# Patient Record
Sex: MALE | Race: BLACK OR AFRICAN AMERICAN
[De-identification: names, ages, dates, MRNs, and addresses within clinical notes are randomized per-mention and may not be internally consistent; named-entity substitution may affect disease eponyms.]

---

## 2018-02-22 ENCOUNTER — HOSPITAL ENCOUNTER (INPATIENT)
Dept: HOSPITAL 74 - YASAS | Age: 53
LOS: 4 days | Discharge: HOME | End: 2018-02-26
Attending: INTERNAL MEDICINE | Admitting: INTERNAL MEDICINE
Payer: COMMERCIAL

## 2018-02-22 VITALS — BODY MASS INDEX: 24.7 KG/M2

## 2018-02-22 DIAGNOSIS — F19.24: ICD-10-CM

## 2018-02-22 DIAGNOSIS — F17.210: ICD-10-CM

## 2018-02-22 DIAGNOSIS — Z21: ICD-10-CM

## 2018-02-22 DIAGNOSIS — F14.20: ICD-10-CM

## 2018-02-22 DIAGNOSIS — G47.00: ICD-10-CM

## 2018-02-22 DIAGNOSIS — F10.230: Primary | ICD-10-CM

## 2018-02-22 DIAGNOSIS — F31.9: ICD-10-CM

## 2018-02-22 DIAGNOSIS — F25.9: ICD-10-CM

## 2018-02-22 DIAGNOSIS — F12.20: ICD-10-CM

## 2018-02-22 PROCEDURE — HZ2ZZZZ DETOXIFICATION SERVICES FOR SUBSTANCE ABUSE TREATMENT: ICD-10-PCS | Performed by: INTERNAL MEDICINE

## 2018-02-22 RX ADMIN — HYDROXYZINE PAMOATE PRN MG: 50 CAPSULE ORAL at 22:29

## 2018-02-22 RX ADMIN — NICOTINE SCH: 14 PATCH, EXTENDED RELEASE TRANSDERMAL at 17:07

## 2018-02-22 RX ADMIN — IBUPROFEN PRN MG: 400 TABLET, FILM COATED ORAL at 22:44

## 2018-02-22 RX ADMIN — Medication SCH MG: at 22:28

## 2018-02-23 LAB
ALBUMIN SERPL-MCNC: 3.7 G/DL (ref 3.4–5)
ALP SERPL-CCNC: 93 U/L (ref 45–117)
ALT SERPL-CCNC: 29 U/L (ref 12–78)
ANION GAP SERPL CALC-SCNC: 12 MMOL/L (ref 8–16)
APPEARANCE UR: CLEAR
AST SERPL-CCNC: 29 U/L (ref 15–37)
BILIRUB SERPL-MCNC: 0.5 MG/DL (ref 0.2–1)
BILIRUB UR STRIP.AUTO-MCNC: NEGATIVE MG/DL
BUN SERPL-MCNC: 13 MG/DL (ref 7–18)
CALCIUM SERPL-MCNC: 9.3 MG/DL (ref 8.5–10.1)
CHLORIDE SERPL-SCNC: 107 MMOL/L (ref 98–107)
CO2 SERPL-SCNC: 23 MMOL/L (ref 21–32)
COLOR UR: (no result)
CREAT SERPL-MCNC: 1.2 MG/DL (ref 0.7–1.3)
DEPRECATED RDW RBC AUTO: 13.4 % (ref 11.9–15.9)
GLUCOSE SERPL-MCNC: 121 MG/DL (ref 74–106)
HCT VFR BLD CALC: 41.1 % (ref 35.4–49)
HGB BLD-MCNC: 13.1 GM/DL (ref 11.7–16.9)
KETONES UR QL STRIP: NEGATIVE
LEUKOCYTE ESTERASE UR QL STRIP.AUTO: NEGATIVE
MCH RBC QN AUTO: 30.8 PG (ref 25.7–33.7)
MCHC RBC AUTO-ENTMCNC: 31.9 G/DL (ref 32–35.9)
MCV RBC: 96.6 FL (ref 80–96)
NITRITE UR QL STRIP: NEGATIVE
PH UR: 6 [PH] (ref 5–8)
PLATELET # BLD AUTO: 213 K/MM3 (ref 134–434)
PMV BLD: 8.8 FL (ref 7.5–11.1)
POTASSIUM SERPLBLD-SCNC: 4.1 MMOL/L (ref 3.5–5.1)
PROT SERPL-MCNC: 7.6 G/DL (ref 6.4–8.2)
PROT UR QL STRIP: NEGATIVE
PROT UR QL STRIP: NEGATIVE
RBC # BLD AUTO: 4.25 M/MM3 (ref 4–5.6)
RBC # UR STRIP: NEGATIVE /UL
SODIUM SERPL-SCNC: 142 MMOL/L (ref 136–145)
SP GR UR: 1.01 (ref 1–1.03)
UROBILINOGEN UR STRIP-MCNC: NEGATIVE MG/DL (ref 0.2–1)
WBC # BLD AUTO: 3.8 K/MM3 (ref 4–10)

## 2018-02-23 RX ADMIN — Medication SCH TAB: at 10:24

## 2018-02-23 RX ADMIN — NICOTINE SCH: 14 PATCH, EXTENDED RELEASE TRANSDERMAL at 10:24

## 2018-02-23 RX ADMIN — QUETIAPINE FUMARATE SCH MG: 100 TABLET ORAL at 22:21

## 2018-02-23 RX ADMIN — Medication SCH MG: at 22:21

## 2018-02-23 RX ADMIN — HYDROXYZINE PAMOATE PRN MG: 50 CAPSULE ORAL at 22:23

## 2018-02-23 RX ADMIN — IBUPROFEN PRN MG: 400 TABLET, FILM COATED ORAL at 17:17

## 2018-02-23 RX ADMIN — ABACAVIR SULFATE, DOLUTEGRAVIR SODIUM, LAMIVUDINE SCH EACH: 600; 50; 300 TABLET, FILM COATED ORAL at 12:19

## 2018-02-24 RX ADMIN — Medication SCH TAB: at 10:37

## 2018-02-24 RX ADMIN — Medication SCH EACH: at 22:36

## 2018-02-24 RX ADMIN — CITALOPRAM HYDROBROMIDE SCH MG: 20 TABLET ORAL at 10:37

## 2018-02-24 RX ADMIN — IBUPROFEN PRN MG: 400 TABLET, FILM COATED ORAL at 10:38

## 2018-02-24 RX ADMIN — QUETIAPINE FUMARATE SCH MG: 100 TABLET ORAL at 22:34

## 2018-02-24 RX ADMIN — NICOTINE SCH: 14 PATCH, EXTENDED RELEASE TRANSDERMAL at 10:37

## 2018-02-24 RX ADMIN — IBUPROFEN PRN MG: 400 TABLET, FILM COATED ORAL at 22:35

## 2018-02-24 RX ADMIN — ABACAVIR SULFATE, DOLUTEGRAVIR SODIUM, LAMIVUDINE SCH EACH: 600; 50; 300 TABLET, FILM COATED ORAL at 10:36

## 2018-02-24 RX ADMIN — LIDOCAINE SCH PATCH: 50 PATCH TOPICAL at 12:48

## 2018-02-24 RX ADMIN — HYDROXYZINE PAMOATE PRN MG: 50 CAPSULE ORAL at 22:37

## 2018-02-24 RX ADMIN — Medication SCH MG: at 22:34

## 2018-02-25 RX ADMIN — Medication SCH TAB: at 10:12

## 2018-02-25 RX ADMIN — CITALOPRAM HYDROBROMIDE SCH MG: 20 TABLET ORAL at 10:12

## 2018-02-25 RX ADMIN — NICOTINE SCH: 14 PATCH, EXTENDED RELEASE TRANSDERMAL at 10:14

## 2018-02-25 RX ADMIN — IBUPROFEN PRN MG: 400 TABLET, FILM COATED ORAL at 22:18

## 2018-02-25 RX ADMIN — IBUPROFEN PRN MG: 400 TABLET, FILM COATED ORAL at 15:32

## 2018-02-25 RX ADMIN — LIDOCAINE SCH PATCH: 50 PATCH TOPICAL at 10:13

## 2018-02-25 RX ADMIN — ABACAVIR SULFATE, DOLUTEGRAVIR SODIUM, LAMIVUDINE SCH EACH: 600; 50; 300 TABLET, FILM COATED ORAL at 10:11

## 2018-02-25 RX ADMIN — QUETIAPINE FUMARATE SCH MG: 100 TABLET ORAL at 22:17

## 2018-02-25 RX ADMIN — Medication SCH MG: at 22:16

## 2018-02-25 RX ADMIN — HYDROXYZINE PAMOATE PRN MG: 50 CAPSULE ORAL at 22:19

## 2018-02-25 RX ADMIN — Medication SCH EACH: at 22:17

## 2018-02-26 VITALS — HEART RATE: 74 BPM | SYSTOLIC BLOOD PRESSURE: 108 MMHG | DIASTOLIC BLOOD PRESSURE: 71 MMHG | TEMPERATURE: 96.8 F

## 2020-02-08 ENCOUNTER — EMERGENCY (EMERGENCY)
Facility: HOSPITAL | Age: 55
LOS: 1 days | Discharge: HOME | End: 2020-02-08
Attending: EMERGENCY MEDICINE | Admitting: EMERGENCY MEDICINE
Payer: MEDICAID

## 2020-02-08 VITALS
SYSTOLIC BLOOD PRESSURE: 123 MMHG | TEMPERATURE: 98 F | DIASTOLIC BLOOD PRESSURE: 88 MMHG | OXYGEN SATURATION: 98 % | RESPIRATION RATE: 16 BRPM | HEART RATE: 75 BPM

## 2020-02-08 VITALS
OXYGEN SATURATION: 97 % | HEART RATE: 91 BPM | RESPIRATION RATE: 18 BRPM | SYSTOLIC BLOOD PRESSURE: 119 MMHG | DIASTOLIC BLOOD PRESSURE: 75 MMHG | TEMPERATURE: 98 F

## 2020-02-08 LAB
ALBUMIN SERPL ELPH-MCNC: 2.6 G/DL — LOW (ref 3.4–5)
ALP SERPL-CCNC: 121 U/L — HIGH (ref 40–120)
ALT FLD-CCNC: 36 U/L — SIGNIFICANT CHANGE UP (ref 12–42)
ANION GAP SERPL CALC-SCNC: 7 MMOL/L — LOW (ref 9–16)
AST SERPL-CCNC: 47 U/L — HIGH (ref 15–37)
BASOPHILS # BLD AUTO: 0.02 K/UL — SIGNIFICANT CHANGE UP (ref 0–0.2)
BASOPHILS NFR BLD AUTO: 0.4 % — SIGNIFICANT CHANGE UP (ref 0–2)
BILIRUB SERPL-MCNC: 0.3 MG/DL — SIGNIFICANT CHANGE UP (ref 0.2–1.2)
BUN SERPL-MCNC: 10 MG/DL — SIGNIFICANT CHANGE UP (ref 7–23)
CALCIUM SERPL-MCNC: 8.9 MG/DL — SIGNIFICANT CHANGE UP (ref 8.5–10.5)
CHLORIDE SERPL-SCNC: 105 MMOL/L — SIGNIFICANT CHANGE UP (ref 96–108)
CO2 SERPL-SCNC: 30 MMOL/L — SIGNIFICANT CHANGE UP (ref 22–31)
CREAT SERPL-MCNC: 0.96 MG/DL — SIGNIFICANT CHANGE UP (ref 0.5–1.3)
EOSINOPHIL # BLD AUTO: 0.15 K/UL — SIGNIFICANT CHANGE UP (ref 0–0.5)
EOSINOPHIL NFR BLD AUTO: 3 % — SIGNIFICANT CHANGE UP (ref 0–6)
ETHANOL SERPL-MCNC: 3 MG/DL — SIGNIFICANT CHANGE UP
GLUCOSE SERPL-MCNC: 70 MG/DL — SIGNIFICANT CHANGE UP (ref 70–99)
HCT VFR BLD CALC: 31.7 % — LOW (ref 39–50)
HGB BLD-MCNC: 10.8 G/DL — LOW (ref 13–17)
IMM GRANULOCYTES NFR BLD AUTO: 0.4 % — SIGNIFICANT CHANGE UP (ref 0–1.5)
LIDOCAIN IGE QN: 198 U/L — SIGNIFICANT CHANGE UP (ref 73–393)
LYMPHOCYTES # BLD AUTO: 1.45 K/UL — SIGNIFICANT CHANGE UP (ref 1–3.3)
LYMPHOCYTES # BLD AUTO: 28.7 % — SIGNIFICANT CHANGE UP (ref 13–44)
MCHC RBC-ENTMCNC: 32.1 PG — SIGNIFICANT CHANGE UP (ref 27–34)
MCHC RBC-ENTMCNC: 34.1 GM/DL — SIGNIFICANT CHANGE UP (ref 32–36)
MCV RBC AUTO: 94.3 FL — SIGNIFICANT CHANGE UP (ref 80–100)
MONOCYTES # BLD AUTO: 0.4 K/UL — SIGNIFICANT CHANGE UP (ref 0–0.9)
MONOCYTES NFR BLD AUTO: 7.9 % — SIGNIFICANT CHANGE UP (ref 2–14)
NEUTROPHILS # BLD AUTO: 3.02 K/UL — SIGNIFICANT CHANGE UP (ref 1.8–7.4)
NEUTROPHILS NFR BLD AUTO: 59.6 % — SIGNIFICANT CHANGE UP (ref 43–77)
NRBC # BLD: 0 /100 WBCS — SIGNIFICANT CHANGE UP (ref 0–0)
PLATELET # BLD AUTO: 152 K/UL — SIGNIFICANT CHANGE UP (ref 150–400)
POTASSIUM SERPL-MCNC: 3.1 MMOL/L — LOW (ref 3.5–5.3)
POTASSIUM SERPL-SCNC: 3.1 MMOL/L — LOW (ref 3.5–5.3)
PROT SERPL-MCNC: 6.9 G/DL — SIGNIFICANT CHANGE UP (ref 6.4–8.2)
RBC # BLD: 3.36 M/UL — LOW (ref 4.2–5.8)
RBC # FLD: 14.2 % — SIGNIFICANT CHANGE UP (ref 10.3–14.5)
SODIUM SERPL-SCNC: 142 MMOL/L — SIGNIFICANT CHANGE UP (ref 132–145)
TROPONIN I SERPL-MCNC: <0.017 NG/ML — LOW (ref 0.02–0.06)
WBC # BLD: 5.06 K/UL — SIGNIFICANT CHANGE UP (ref 3.8–10.5)
WBC # FLD AUTO: 5.06 K/UL — SIGNIFICANT CHANGE UP (ref 3.8–10.5)

## 2020-02-08 PROCEDURE — 72070 X-RAY EXAM THORAC SPINE 2VWS: CPT | Mod: 26

## 2020-02-08 PROCEDURE — 93010 ELECTROCARDIOGRAM REPORT: CPT

## 2020-02-08 PROCEDURE — 99285 EMERGENCY DEPT VISIT HI MDM: CPT | Mod: 25

## 2020-02-08 PROCEDURE — 72100 X-RAY EXAM L-S SPINE 2/3 VWS: CPT | Mod: 26

## 2020-02-08 RX ORDER — METHOCARBAMOL 500 MG/1
1 TABLET, FILM COATED ORAL
Qty: 15 | Refills: 0
Start: 2020-02-08 | End: 2020-02-12

## 2020-02-08 RX ORDER — IBUPROFEN 200 MG
600 TABLET ORAL ONCE
Refills: 0 | Status: COMPLETED | OUTPATIENT
Start: 2020-02-08 | End: 2020-02-08

## 2020-02-08 RX ORDER — METHOCARBAMOL 500 MG/1
750 TABLET, FILM COATED ORAL ONCE
Refills: 0 | Status: COMPLETED | OUTPATIENT
Start: 2020-02-08 | End: 2020-02-08

## 2020-02-08 RX ORDER — POTASSIUM CHLORIDE 20 MEQ
40 PACKET (EA) ORAL ONCE
Refills: 0 | Status: COMPLETED | OUTPATIENT
Start: 2020-02-08 | End: 2020-02-08

## 2020-02-08 RX ORDER — IBUPROFEN 200 MG
1 TABLET ORAL
Qty: 20 | Refills: 0
Start: 2020-02-08 | End: 2020-02-12

## 2020-02-08 RX ADMIN — Medication 40 MILLIEQUIVALENT(S): at 21:32

## 2020-02-08 RX ADMIN — METHOCARBAMOL 750 MILLIGRAM(S): 500 TABLET, FILM COATED ORAL at 23:35

## 2020-02-08 RX ADMIN — Medication 600 MILLIGRAM(S): at 23:35

## 2020-02-08 NOTE — ED PROVIDER NOTE - OBJECTIVE STATEMENT
Altered mental status and malaise after alcohol and marijuana use.  Vital signs reviewed.  Patient conversant and denies CP, abd pain, SOB or headache at this time. Altered mental status and malaise after alcohol and marijuana use.  Vital signs reviewed.  Patient conversant and denies CP, abd pain, SOB or headache at this time.  EKG NSR at 61, no ST changes. Pt w hx of HIV (currently not on tx), presents after drinking some alcohol and smoking some marihuana earlier. States "I passed out" and has some back pain lumbar and thoracic. Pt denies any similar episodes, chest pain, sob, abd pain, nausea, vomiting. States he smokes marihuana weekly, recreationally. no leg/arm weakness or paresthesias.

## 2020-02-08 NOTE — ED PROVIDER NOTE - MUSCULOSKELETAL, MLM
Spine appears normal, range of motion is not limited, B/L paraspinal tenderness lumbar and thoracic, no step offs.

## 2020-02-08 NOTE — ED PROVIDER NOTE - NSFOLLOWUPINSTRUCTIONS_ED_ALL_ED_FT
Syncope    Syncope is when you temporarily lose consciousness, also called fainting or passing out. It is caused by a sudden decrease in blood flow to the brain. Even though most causes of syncope are not dangerous, syncope can possibly be a sign of a serious medical problem. Signs that you may be about to faint include feeling dizzy, lightheaded, nausea, visual changes, or cold/clammy skin. Do not drive, operate heavy machinery, or play sports until your health care provider says it is okay.    SEEK IMMEDIATE MEDICAL CARE IF YOU HAVE ANY OF THE FOLLOWING SYMPTOMS: severe headache, pain in your chest/abdomen/back, bleeding from your mouth or rectum, palpitations, shortness of breath, pain with breathing, seizure, confusion, or trouble walking.    Back Pain    Back pain is very common in adults. The cause of back pain is rarely dangerous and the pain often gets better over time. The cause of your back pain may not be known and may include strain of muscles or ligaments, degeneration of the spinal disks, or arthritis. Occasionally the pain may radiate down your leg(s). Over-the-counter medicines to reduce pain and inflammation are often the most helpful. Stretching and remaining active frequently helps the healing process.     SEEK IMMEDIATE MEDICAL CARE IF YOU HAVE ANY OF THE FOLLOWING SYMPTOMS: bowel or bladder control problems, unusual weakness or numbness in your arms or legs, nausea or vomiting, abdominal pain, fever, dizziness/lightheadedness.

## 2020-02-08 NOTE — ED PROVIDER NOTE - PROGRESS NOTE DETAILS
blood work unremarkable including cardiac, lytes, xrays - no traumatic findings. Will DC w follow up instructions.

## 2020-02-08 NOTE — ED PROVIDER NOTE - DIAGNOSTIC INTERPRETATION
Interpreted by ED physician  Thoracic and Lumbar x-ray, 2 views  No fracture, no dislocation (joint spaces grossly normal)

## 2020-02-08 NOTE — ED PROVIDER NOTE - CLINICAL SUMMARY MEDICAL DECISION MAKING FREE TEXT BOX
Pt "passed out" in the setting of mixing alcohol and marihuana. no distress on my examination. Some tenderness difusely on back (L/Th), will get labs, xrays. EKG non ischemic, NSR

## 2020-02-08 NOTE — ED ADULT NURSE REASSESSMENT NOTE - NS ED NURSE REASSESS COMMENT FT1
Pt care handed over to myself, introduced self to patient, iv inserted, bloods sent, pt gcs 15 awaiting cat scans pt aware, adivsed to give urine

## 2020-02-08 NOTE — ED ADULT TRIAGE NOTE - CHIEF COMPLAINT QUOTE
Patient picked up at [facility] , feeling dizzy, near syncope after drinking alcohol and smoking marijuana

## 2020-02-08 NOTE — ED PROVIDER NOTE - PATIENT PORTAL LINK FT
You can access the FollowMyHealth Patient Portal offered by [facility] by registering at the following website: [email or web address]. By joining [facility]’s FollowMyHealth portal, you will also be able to view your health information using other applications (apps) compatible with our system.

## 2020-02-08 NOTE — ED PROVIDER NOTE - CARE PROVIDER_API CALL
Jose Luis Mehta)  Cardiovascular Disease  [address] [place] [address]  Phone: [phone]  Fax: [phone]  Follow Up Time:

## 2020-02-08 NOTE — ED PROVIDER NOTE - CARE PLAN
Principal Discharge DX:	Altered mental status Principal Discharge DX:	Syncope  Secondary Diagnosis:	Back pain

## 2020-02-08 NOTE — ED PROVIDER NOTE - RAPID ASSESSMENT
Altered mental status and malaise after alcohol and marijuana use.  Vital signs reviewed.  Patient conversant and denies CP, abd pain, SOB or headache at this time.  EKG NSR at 61, no ST changes.

## 2020-02-08 NOTE — ED ADULT NURSE NOTE - NSIMPLEMENTINTERV_GEN_ALL_ED
Implemented All Fall with Harm Risk Interventions:  [place] to call system. Call bell, personal items and telephone within reach. Instruct patient to call for assistance. Room bathroom lighting operational. Non-slip footwear when patient is off stretcher. Physically safe environment: no spills, clutter or unnecessary equipment. Stretcher in lowest position, wheels locked, appropriate side rails in place. Provide visual cue, wrist band, yellow gown, etc. Monitor gait and stability. Monitor for mental status changes and reorient to person, place, and time. Review medications for side effects contributing to fall risk. Reinforce activity limits and safety measures with patient and family. Provide visual clues: red socks.

## 2020-02-14 DIAGNOSIS — M54.5 LOW BACK PAIN: ICD-10-CM

## 2020-02-14 DIAGNOSIS — R41.82 ALTERED MENTAL STATUS, UNSPECIFIED: ICD-10-CM

## 2020-02-14 DIAGNOSIS — R55 SYNCOPE AND COLLAPSE: ICD-10-CM

## 2020-02-14 DIAGNOSIS — M54.6 PAIN IN THORACIC SPINE: ICD-10-CM

## 2021-03-08 ENCOUNTER — HOSPITAL ENCOUNTER (INPATIENT)
Dept: HOSPITAL 74 - YASAS | Age: 56
LOS: 2 days | Discharge: HOME | End: 2021-03-10
Attending: ALLERGY & IMMUNOLOGY | Admitting: ALLERGY & IMMUNOLOGY
Payer: COMMERCIAL

## 2021-03-08 VITALS — BODY MASS INDEX: 18 KG/M2

## 2021-03-08 DIAGNOSIS — R63.4: ICD-10-CM

## 2021-03-08 DIAGNOSIS — K86.1: ICD-10-CM

## 2021-03-08 DIAGNOSIS — F10.230: Primary | ICD-10-CM

## 2021-03-08 DIAGNOSIS — R12: ICD-10-CM

## 2021-03-08 DIAGNOSIS — F19.282: ICD-10-CM

## 2021-03-08 DIAGNOSIS — Z91.410: ICD-10-CM

## 2021-03-08 DIAGNOSIS — F17.210: ICD-10-CM

## 2021-03-08 DIAGNOSIS — F12.20: ICD-10-CM

## 2021-03-08 DIAGNOSIS — F31.9: ICD-10-CM

## 2021-03-08 DIAGNOSIS — F19.24: ICD-10-CM

## 2021-03-08 DIAGNOSIS — I69.893: ICD-10-CM

## 2021-03-08 DIAGNOSIS — R07.2: ICD-10-CM

## 2021-03-08 DIAGNOSIS — Z62.810: ICD-10-CM

## 2021-03-08 DIAGNOSIS — Z21: ICD-10-CM

## 2021-03-08 PROCEDURE — HZ2ZZZZ DETOXIFICATION SERVICES FOR SUBSTANCE ABUSE TREATMENT: ICD-10-PCS | Performed by: ALLERGY & IMMUNOLOGY

## 2021-03-08 PROCEDURE — U0003 INFECTIOUS AGENT DETECTION BY NUCLEIC ACID (DNA OR RNA); SEVERE ACUTE RESPIRATORY SYNDROME CORONAVIRUS 2 (SARS-COV-2) (CORONAVIRUS DISEASE [COVID-19]), AMPLIFIED PROBE TECHNIQUE, MAKING USE OF HIGH THROUGHPUT TECHNOLOGIES AS DESCRIBED BY CMS-2020-01-R: HCPCS

## 2021-03-08 PROCEDURE — C9803 HOPD COVID-19 SPEC COLLECT: HCPCS

## 2021-03-08 RX ADMIN — Medication SCH MG: at 22:20

## 2021-03-08 RX ADMIN — Medication SCH MG: at 22:19

## 2021-03-08 RX ADMIN — HYDROXYZINE PAMOATE SCH MG: 25 CAPSULE ORAL at 22:20

## 2021-03-08 RX ADMIN — HYDROXYZINE PAMOATE SCH MG: 25 CAPSULE ORAL at 18:18

## 2021-03-08 RX ADMIN — Medication SCH TAB: at 18:17

## 2021-03-09 LAB
ALBUMIN SERPL-MCNC: 2.4 G/DL (ref 3.4–5)
ALP SERPL-CCNC: 191 U/L (ref 45–117)
ALT SERPL-CCNC: 49 U/L (ref 13–61)
ANION GAP SERPL CALC-SCNC: 5 MMOL/L (ref 8–16)
AST SERPL-CCNC: 83 U/L (ref 15–37)
BILIRUB SERPL-MCNC: 0.4 MG/DL (ref 0.2–1)
BUN SERPL-MCNC: 8.8 MG/DL (ref 7–18)
CALCIUM SERPL-MCNC: 8.7 MG/DL (ref 8.5–10.1)
CHLORIDE SERPL-SCNC: 107 MMOL/L (ref 98–107)
CO2 SERPL-SCNC: 30 MMOL/L (ref 21–32)
CREAT SERPL-MCNC: 0.8 MG/DL (ref 0.55–1.3)
DEPRECATED RDW RBC AUTO: 13.4 % (ref 11.9–15.9)
GLUCOSE SERPL-MCNC: 86 MG/DL (ref 74–106)
HCT VFR BLD CALC: 34.2 % (ref 35.4–49)
HGB BLD-MCNC: 11.3 GM/DL (ref 11.7–16.9)
MCH RBC QN AUTO: 34.7 PG (ref 25.7–33.7)
MCHC RBC AUTO-ENTMCNC: 33 G/DL (ref 32–35.9)
MCV RBC: 104.9 FL (ref 80–96)
PLATELET # BLD AUTO: 380 K/MM3 (ref 134–434)
PMV BLD: 7.3 FL (ref 7.5–11.1)
POTASSIUM SERPLBLD-SCNC: 3.7 MMOL/L (ref 3.5–5.1)
PROT SERPL-MCNC: 6.7 G/DL (ref 6.4–8.2)
RBC # BLD AUTO: 3.27 M/MM3 (ref 4–5.6)
SODIUM SERPL-SCNC: 142 MMOL/L (ref 136–145)
WBC # BLD AUTO: 5.4 K/MM3 (ref 4–10)

## 2021-03-09 RX ADMIN — HYDROXYZINE PAMOATE SCH MG: 25 CAPSULE ORAL at 05:51

## 2021-03-09 RX ADMIN — Medication SCH MG: at 22:18

## 2021-03-09 RX ADMIN — Medication SCH MG: at 22:19

## 2021-03-09 RX ADMIN — Medication SCH TAB: at 10:05

## 2021-03-09 RX ADMIN — BICTEGRAVIR SODIUM, EMTRICITABINE, AND TENOFOVIR ALAFENAMIDE FUMARATE SCH EACH: 50; 200; 25 TABLET ORAL at 11:28

## 2021-03-09 RX ADMIN — ALUMINUM HYDROXIDE, MAGNESIUM HYDROXIDE, AND SIMETHICONE PRN ML: 200; 200; 20 SUSPENSION ORAL at 11:28

## 2021-03-09 RX ADMIN — HYDROXYZINE PAMOATE SCH: 25 CAPSULE ORAL at 10:05

## 2021-03-10 VITALS — TEMPERATURE: 97.3 F | DIASTOLIC BLOOD PRESSURE: 88 MMHG | SYSTOLIC BLOOD PRESSURE: 120 MMHG | HEART RATE: 107 BPM

## 2021-03-10 RX ADMIN — BICTEGRAVIR SODIUM, EMTRICITABINE, AND TENOFOVIR ALAFENAMIDE FUMARATE SCH EACH: 50; 200; 25 TABLET ORAL at 07:17

## 2021-03-10 RX ADMIN — ALUMINUM HYDROXIDE, MAGNESIUM HYDROXIDE, AND SIMETHICONE PRN ML: 200; 200; 20 SUSPENSION ORAL at 11:40

## 2021-03-10 RX ADMIN — Medication SCH TAB: at 11:05

## 2021-11-15 ENCOUNTER — HOSPITAL ENCOUNTER (INPATIENT)
Dept: HOSPITAL 74 - JER | Age: 56
LOS: 3 days | Discharge: LEFT BEFORE DISCHARGE | DRG: 770 | End: 2021-11-18
Attending: NURSE PRACTITIONER | Admitting: INTERNAL MEDICINE
Payer: COMMERCIAL

## 2021-11-15 VITALS — BODY MASS INDEX: 16.5 KG/M2

## 2021-11-15 DIAGNOSIS — R15.9: ICD-10-CM

## 2021-11-15 DIAGNOSIS — F10.230: Primary | ICD-10-CM

## 2021-11-15 DIAGNOSIS — D53.9: ICD-10-CM

## 2021-11-15 DIAGNOSIS — G47.00: ICD-10-CM

## 2021-11-15 DIAGNOSIS — F31.9: ICD-10-CM

## 2021-11-15 DIAGNOSIS — M54.12: ICD-10-CM

## 2021-11-15 DIAGNOSIS — Z21: ICD-10-CM

## 2021-11-15 DIAGNOSIS — F12.20: ICD-10-CM

## 2021-11-15 DIAGNOSIS — Z86.73: ICD-10-CM

## 2021-11-15 DIAGNOSIS — M21.371: ICD-10-CM

## 2021-11-15 DIAGNOSIS — Z53.29: ICD-10-CM

## 2021-11-15 DIAGNOSIS — R63.4: ICD-10-CM

## 2021-11-15 LAB
ALBUMIN SERPL-MCNC: 2.5 G/DL (ref 3.4–5)
ALP SERPL-CCNC: 151 U/L (ref 45–117)
ALT SERPL-CCNC: 67 U/L (ref 13–61)
ANION GAP SERPL CALC-SCNC: 10 MMOL/L (ref 8–16)
APPEARANCE UR: CLEAR
APTT BLD: 24.7 SECONDS (ref 25.2–36.5)
AST SERPL-CCNC: 117 U/L (ref 15–37)
BACTERIA # UR AUTO: 4 /UL (ref 0–1359)
BASOPHILS # BLD: 1.2 % (ref 0–2)
BILIRUB SERPL-MCNC: 0.4 MG/DL (ref 0.2–1)
BILIRUB UR STRIP.AUTO-MCNC: NEGATIVE MG/DL
BUN SERPL-MCNC: 7.7 MG/DL (ref 7–18)
CALCIUM SERPL-MCNC: 8.1 MG/DL (ref 8.5–10.1)
CASTS URNS QL MICRO: 0 /UL (ref 0–3.1)
CHLORIDE SERPL-SCNC: 106 MMOL/L (ref 98–107)
CO2 SERPL-SCNC: 22 MMOL/L (ref 21–32)
COLOR UR: YELLOW
CREAT SERPL-MCNC: 1.1 MG/DL (ref 0.55–1.3)
DEPRECATED RDW RBC AUTO: 14.7 % (ref 11.9–15.9)
EOSINOPHIL # BLD: 0.7 % (ref 0–4.5)
EPITH CASTS URNS QL MICRO: 4 /UL (ref 0–25.1)
GLUCOSE SERPL-MCNC: 76 MG/DL (ref 74–106)
HCT VFR BLD CALC: 29.1 % (ref 35.4–49)
HGB BLD-MCNC: 9.8 GM/DL (ref 11.7–16.9)
INR BLD: 1.01 (ref 0.83–1.09)
KETONES UR QL STRIP: NEGATIVE
LEUKOCYTE ESTERASE UR QL STRIP.AUTO: NEGATIVE
LYMPHOCYTES # BLD: 24 % (ref 8–40)
MCH RBC QN AUTO: 36.4 PG (ref 25.7–33.7)
MCHC RBC AUTO-ENTMCNC: 33.5 G/DL (ref 32–35.9)
MCV RBC: 108.4 FL (ref 80–96)
MONOCYTES # BLD AUTO: 11.7 % (ref 3.8–10.2)
NEUTROPHILS # BLD: 62.4 % (ref 42.8–82.8)
NITRITE UR QL STRIP: NEGATIVE
PH UR: 6 [PH] (ref 5–8)
PLATELET # BLD AUTO: 163 10^3/UL (ref 134–434)
PMV BLD: 8.1 FL (ref 7.5–11.1)
PROT SERPL-MCNC: 7 G/DL (ref 6.4–8.2)
PROT UR QL STRIP: (no result)
PROT UR QL STRIP: (no result)
PT PNL PPP: 11.8 SEC (ref 9.7–13)
RBC # BLD AUTO: 2.69 M/MM3 (ref 4–5.6)
RBC # BLD AUTO: 33 /UL (ref 0–23.9)
SODIUM SERPL-SCNC: 138 MMOL/L (ref 136–145)
SP GR UR: 1.02 (ref 1.01–1.03)
UROBILINOGEN UR STRIP-MCNC: 0.2 MG/DL (ref 0.2–1)
WBC # BLD AUTO: 6.4 K/MM3 (ref 4–10)
WBC # UR AUTO: 9 /UL (ref 0–25.8)

## 2021-11-15 PROCEDURE — U0003 INFECTIOUS AGENT DETECTION BY NUCLEIC ACID (DNA OR RNA); SEVERE ACUTE RESPIRATORY SYNDROME CORONAVIRUS 2 (SARS-COV-2) (CORONAVIRUS DISEASE [COVID-19]), AMPLIFIED PROBE TECHNIQUE, MAKING USE OF HIGH THROUGHPUT TECHNOLOGIES AS DESCRIBED BY CMS-2020-01-R: HCPCS

## 2021-11-15 PROCEDURE — G0009 ADMIN PNEUMOCOCCAL VACCINE: HCPCS

## 2021-11-15 PROCEDURE — G0008 ADMIN INFLUENZA VIRUS VAC: HCPCS

## 2021-11-15 PROCEDURE — G0378 HOSPITAL OBSERVATION PER HR: HCPCS

## 2021-11-15 PROCEDURE — C9803 HOPD COVID-19 SPEC COLLECT: HCPCS

## 2021-11-15 PROCEDURE — U0005 INFEC AGEN DETEC AMPLI PROBE: HCPCS

## 2021-11-16 LAB
ALBUMIN SERPL-MCNC: 2.2 G/DL (ref 3.4–5)
ALP SERPL-CCNC: 143 U/L (ref 45–117)
ALT SERPL-CCNC: 64 U/L (ref 13–61)
ANION GAP SERPL CALC-SCNC: 5 MMOL/L (ref 8–16)
AST SERPL-CCNC: 127 U/L (ref 15–37)
BASOPHILS # BLD: 0.5 % (ref 0–2)
BILIRUB SERPL-MCNC: 0.7 MG/DL (ref 0.2–1)
BUN SERPL-MCNC: 10.8 MG/DL (ref 7–18)
CALCIUM SERPL-MCNC: 7.7 MG/DL (ref 8.5–10.1)
CHLORIDE SERPL-SCNC: 110 MMOL/L (ref 98–107)
CO2 SERPL-SCNC: 24 MMOL/L (ref 21–32)
CREAT SERPL-MCNC: 1 MG/DL (ref 0.55–1.3)
DEPRECATED RDW RBC AUTO: 14.5 % (ref 11.9–15.9)
EOSINOPHIL # BLD: 0.7 % (ref 0–4.5)
GLUCOSE SERPL-MCNC: 75 MG/DL (ref 74–106)
HCT VFR BLD CALC: 30.5 % (ref 35.4–49)
HGB BLD-MCNC: 10.3 GM/DL (ref 11.7–16.9)
LYMPHOCYTES # BLD: 20.8 % (ref 8–40)
MAGNESIUM SERPL-MCNC: 1.5 MG/DL (ref 1.8–2.4)
MCH RBC QN AUTO: 36.3 PG (ref 25.7–33.7)
MCHC RBC AUTO-ENTMCNC: 33.7 G/DL (ref 32–35.9)
MCV RBC: 107.8 FL (ref 80–96)
MONOCYTES # BLD AUTO: 10 % (ref 3.8–10.2)
NEUTROPHILS # BLD: 68 % (ref 42.8–82.8)
PHOSPHATE SERPL-MCNC: 1.9 MG/DL (ref 2.5–4.9)
PLATELET # BLD AUTO: 135 10^3/UL (ref 134–434)
PMV BLD: 8.4 FL (ref 7.5–11.1)
PROT SERPL-MCNC: 6.4 G/DL (ref 6.4–8.2)
RBC # BLD AUTO: 2.83 M/MM3 (ref 4–5.6)
SODIUM SERPL-SCNC: 139 MMOL/L (ref 136–145)
WBC # BLD AUTO: 6 K/MM3 (ref 4–10)

## 2021-11-16 RX ADMIN — ENOXAPARIN SODIUM SCH MG: 40 INJECTION SUBCUTANEOUS at 10:47

## 2021-11-16 RX ADMIN — DEXTROSE AND SODIUM CHLORIDE SCH MLS/HR: 5; 900 INJECTION, SOLUTION INTRAVENOUS at 04:34

## 2021-11-16 RX ADMIN — PANTOPRAZOLE SODIUM SCH MG: 40 INJECTION, POWDER, FOR SOLUTION INTRAVENOUS at 10:48

## 2021-11-16 RX ADMIN — BICTEGRAVIR SODIUM, EMTRICITABINE, AND TENOFOVIR ALAFENAMIDE FUMARATE SCH EACH: 50; 200; 25 TABLET ORAL at 13:30

## 2021-11-16 RX ADMIN — PANTOPRAZOLE SODIUM SCH MG: 40 INJECTION, POWDER, FOR SOLUTION INTRAVENOUS at 03:57

## 2021-11-17 VITALS — HEART RATE: 101 BPM | DIASTOLIC BLOOD PRESSURE: 79 MMHG | SYSTOLIC BLOOD PRESSURE: 107 MMHG | TEMPERATURE: 97.7 F

## 2021-11-17 RX ADMIN — BICTEGRAVIR SODIUM, EMTRICITABINE, AND TENOFOVIR ALAFENAMIDE FUMARATE SCH EACH: 50; 200; 25 TABLET ORAL at 09:12

## 2021-11-17 RX ADMIN — ENOXAPARIN SODIUM SCH MG: 40 INJECTION SUBCUTANEOUS at 09:11

## 2021-11-17 RX ADMIN — DEXTROSE AND SODIUM CHLORIDE SCH: 5; 900 INJECTION, SOLUTION INTRAVENOUS at 05:05

## 2021-11-17 RX ADMIN — PANTOPRAZOLE SODIUM SCH MG: 40 INJECTION, POWDER, FOR SOLUTION INTRAVENOUS at 09:11

## 2021-11-18 RX ADMIN — DEXTROSE AND SODIUM CHLORIDE SCH: 5; 900 INJECTION, SOLUTION INTRAVENOUS at 05:44

## 2022-04-20 ENCOUNTER — HOSPITAL ENCOUNTER (INPATIENT)
Dept: HOSPITAL 74 - YASAS | Age: 57
LOS: 4 days | Discharge: HOME | End: 2022-04-24
Attending: ALLERGY & IMMUNOLOGY | Admitting: ALLERGY & IMMUNOLOGY
Payer: COMMERCIAL

## 2022-04-20 VITALS — BODY MASS INDEX: 17.2 KG/M2

## 2022-04-20 DIAGNOSIS — F14.20: ICD-10-CM

## 2022-04-20 DIAGNOSIS — Z86.11: ICD-10-CM

## 2022-04-20 DIAGNOSIS — F25.9: ICD-10-CM

## 2022-04-20 DIAGNOSIS — F17.210: ICD-10-CM

## 2022-04-20 DIAGNOSIS — Z91.410: ICD-10-CM

## 2022-04-20 DIAGNOSIS — Z86.73: ICD-10-CM

## 2022-04-20 DIAGNOSIS — Z21: ICD-10-CM

## 2022-04-20 DIAGNOSIS — M54.89: ICD-10-CM

## 2022-04-20 DIAGNOSIS — F31.9: ICD-10-CM

## 2022-04-20 DIAGNOSIS — G47.00: ICD-10-CM

## 2022-04-20 DIAGNOSIS — Z86.19: ICD-10-CM

## 2022-04-20 DIAGNOSIS — F12.20: ICD-10-CM

## 2022-04-20 DIAGNOSIS — F10.282: ICD-10-CM

## 2022-04-20 DIAGNOSIS — Z62.810: ICD-10-CM

## 2022-04-20 DIAGNOSIS — F19.24: ICD-10-CM

## 2022-04-20 DIAGNOSIS — F10.230: Primary | ICD-10-CM

## 2022-04-20 DIAGNOSIS — D50.9: ICD-10-CM

## 2022-04-20 PROCEDURE — HZ2ZZZZ DETOXIFICATION SERVICES FOR SUBSTANCE ABUSE TREATMENT: ICD-10-PCS | Performed by: ALLERGY & IMMUNOLOGY

## 2022-04-20 PROCEDURE — U0005 INFEC AGEN DETEC AMPLI PROBE: HCPCS

## 2022-04-20 PROCEDURE — U0003 INFECTIOUS AGENT DETECTION BY NUCLEIC ACID (DNA OR RNA); SEVERE ACUTE RESPIRATORY SYNDROME CORONAVIRUS 2 (SARS-COV-2) (CORONAVIRUS DISEASE [COVID-19]), AMPLIFIED PROBE TECHNIQUE, MAKING USE OF HIGH THROUGHPUT TECHNOLOGIES AS DESCRIBED BY CMS-2020-01-R: HCPCS

## 2022-04-20 RX ADMIN — Medication PRN MG: at 22:53

## 2022-04-20 RX ADMIN — BICTEGRAVIR SODIUM, EMTRICITABINE, AND TENOFOVIR ALAFENAMIDE FUMARATE SCH EACH: 50; 200; 25 TABLET ORAL at 22:53

## 2022-04-20 RX ADMIN — Medication SCH MG: at 22:53

## 2022-04-21 LAB
ALBUMIN SERPL-MCNC: 1.8 G/DL (ref 3.4–5)
ALP SERPL-CCNC: 180 U/L (ref 45–117)
ALT SERPL-CCNC: 9 U/L (ref 13–61)
ANION GAP SERPL CALC-SCNC: 8 MMOL/L (ref 8–16)
AST SERPL-CCNC: 21 U/L (ref 15–37)
BILIRUB SERPL-MCNC: 0.3 MG/DL (ref 0.2–1)
BUN SERPL-MCNC: 7.7 MG/DL (ref 7–18)
CALCIUM SERPL-MCNC: 8 MG/DL (ref 8.5–10.1)
CHLORIDE SERPL-SCNC: 107 MMOL/L (ref 98–107)
CO2 SERPL-SCNC: 26 MMOL/L (ref 21–32)
CREAT SERPL-MCNC: 0.9 MG/DL (ref 0.55–1.3)
DEPRECATED RDW RBC AUTO: 14.7 % (ref 11.9–15.9)
GLUCOSE SERPL-MCNC: 141 MG/DL (ref 74–106)
HCT VFR BLD CALC: 24.2 % (ref 35.4–49)
HGB BLD-MCNC: 7.8 GM/DL (ref 11.7–16.9)
MCH RBC QN AUTO: 33.8 PG (ref 25.7–33.7)
MCHC RBC AUTO-ENTMCNC: 32.3 G/DL (ref 32–35.9)
MCV RBC: 104.6 FL (ref 80–96)
PLATELET # BLD AUTO: 361 10^3/UL (ref 134–434)
PMV BLD: 7.2 FL (ref 7.5–11.1)
PROT SERPL-MCNC: 5.7 G/DL (ref 6.4–8.2)
RBC # BLD AUTO: 2.32 M/MM3 (ref 4–5.6)
SODIUM SERPL-SCNC: 141 MMOL/L (ref 136–145)
WBC # BLD AUTO: 9.4 K/MM3 (ref 4–10)

## 2022-04-21 RX ADMIN — QUETIAPINE FUMARATE SCH MG: 100 TABLET ORAL at 22:06

## 2022-04-21 RX ADMIN — POTASSIUM CHLORIDE SCH MEQ: 20 SOLUTION ORAL at 22:06

## 2022-04-21 RX ADMIN — PANTOPRAZOLE SODIUM SCH MG: 40 TABLET, DELAYED RELEASE ORAL at 10:32

## 2022-04-21 RX ADMIN — Medication SCH TAB: at 10:31

## 2022-04-21 RX ADMIN — BICTEGRAVIR SODIUM, EMTRICITABINE, AND TENOFOVIR ALAFENAMIDE FUMARATE SCH EACH: 50; 200; 25 TABLET ORAL at 10:32

## 2022-04-21 RX ADMIN — Medication SCH MG: at 22:06

## 2022-04-21 RX ADMIN — Medication PRN MG: at 22:06

## 2022-04-22 LAB
ALP SERPL-CCNC: 156 U/L (ref 45–117)
DEPRECATED RDW RBC AUTO: 15 % (ref 11.9–15.9)
HCT VFR BLD CALC: 24.9 % (ref 35.4–49)
HGB BLD-MCNC: 8 GM/DL (ref 11.7–16.9)
IRON SERPL-MCNC: 19 UG/DL (ref 50–175)
MCH RBC QN AUTO: 34 PG (ref 25.7–33.7)
MCHC RBC AUTO-ENTMCNC: 32.3 G/DL (ref 32–35.9)
MCV RBC: 105.2 FL (ref 80–96)
PLATELET # BLD AUTO: 329 10^3/UL (ref 134–434)
PMV BLD: 7.3 FL (ref 7.5–11.1)
RBC # BLD AUTO: 2.36 M/MM3 (ref 4–5.6)
RETICS # AUTO: 1.3 % (ref 0.5–1.5)
TIBC SERPL-MCNC: 160 UG/DL (ref 250–450)
WBC # BLD AUTO: 10.6 K/MM3 (ref 4–10)

## 2022-04-22 RX ADMIN — PANTOPRAZOLE SODIUM SCH MG: 40 TABLET, DELAYED RELEASE ORAL at 11:09

## 2022-04-22 RX ADMIN — QUETIAPINE FUMARATE SCH MG: 100 TABLET ORAL at 22:20

## 2022-04-22 RX ADMIN — BICTEGRAVIR SODIUM, EMTRICITABINE, AND TENOFOVIR ALAFENAMIDE FUMARATE SCH EACH: 50; 200; 25 TABLET ORAL at 11:10

## 2022-04-22 RX ADMIN — Medication SCH MG: at 22:20

## 2022-04-22 RX ADMIN — FERROUS SULFATE TAB EC 324 MG (65 MG FE EQUIVALENT) SCH MG: 324 (65 FE) TABLET DELAYED RESPONSE at 17:35

## 2022-04-22 RX ADMIN — POTASSIUM CHLORIDE SCH: 20 SOLUTION ORAL at 22:22

## 2022-04-22 RX ADMIN — POTASSIUM CHLORIDE SCH MEQ: 20 SOLUTION ORAL at 11:09

## 2022-04-22 RX ADMIN — Medication SCH TAB: at 11:09

## 2022-04-23 RX ADMIN — FERROUS SULFATE TAB EC 324 MG (65 MG FE EQUIVALENT) SCH MG: 324 (65 FE) TABLET DELAYED RESPONSE at 17:49

## 2022-04-23 RX ADMIN — PANTOPRAZOLE SODIUM SCH MG: 40 TABLET, DELAYED RELEASE ORAL at 11:24

## 2022-04-23 RX ADMIN — Medication SCH MG: at 22:06

## 2022-04-23 RX ADMIN — FERROUS SULFATE TAB EC 324 MG (65 MG FE EQUIVALENT) SCH MG: 324 (65 FE) TABLET DELAYED RESPONSE at 07:55

## 2022-04-23 RX ADMIN — BICTEGRAVIR SODIUM, EMTRICITABINE, AND TENOFOVIR ALAFENAMIDE FUMARATE SCH EACH: 50; 200; 25 TABLET ORAL at 11:24

## 2022-04-23 RX ADMIN — FERROUS SULFATE TAB EC 324 MG (65 MG FE EQUIVALENT) SCH MG: 324 (65 FE) TABLET DELAYED RESPONSE at 11:26

## 2022-04-23 RX ADMIN — QUETIAPINE FUMARATE SCH MG: 100 TABLET ORAL at 22:06

## 2022-04-23 RX ADMIN — Medication SCH TAB: at 11:24

## 2022-04-24 VITALS — HEART RATE: 96 BPM | SYSTOLIC BLOOD PRESSURE: 116 MMHG | TEMPERATURE: 97.8 F | DIASTOLIC BLOOD PRESSURE: 73 MMHG

## 2022-04-24 RX ADMIN — Medication SCH TAB: at 10:26

## 2022-04-24 RX ADMIN — FERROUS SULFATE TAB EC 324 MG (65 MG FE EQUIVALENT) SCH MG: 324 (65 FE) TABLET DELAYED RESPONSE at 13:05

## 2022-04-24 RX ADMIN — FERROUS SULFATE TAB EC 324 MG (65 MG FE EQUIVALENT) SCH MG: 324 (65 FE) TABLET DELAYED RESPONSE at 07:37

## 2022-04-24 RX ADMIN — FERROUS SULFATE TAB EC 324 MG (65 MG FE EQUIVALENT) SCH: 324 (65 FE) TABLET DELAYED RESPONSE at 18:08

## 2022-04-24 RX ADMIN — PANTOPRAZOLE SODIUM SCH MG: 40 TABLET, DELAYED RELEASE ORAL at 10:26

## 2022-04-24 RX ADMIN — BICTEGRAVIR SODIUM, EMTRICITABINE, AND TENOFOVIR ALAFENAMIDE FUMARATE SCH EACH: 50; 200; 25 TABLET ORAL at 10:26

## 2022-08-11 NOTE — ED PROVIDER NOTE - CONSTITUTIONAL, MLM
Addended by: ISELA CAMPBELL on: 8/11/2022 09:25 AM     Modules accepted: Orders     normal... Well appearing, awake, alert, oriented to person, place, time/situation and in no apparent distress. mild slurr in speech

## 2022-10-04 ENCOUNTER — HOSPITAL ENCOUNTER (INPATIENT)
Dept: HOSPITAL 74 - YASAS | Age: 57
LOS: 4 days | Discharge: OTHER INSTITUTION | End: 2022-10-08
Attending: SURGERY | Admitting: ALLERGY & IMMUNOLOGY
Payer: COMMERCIAL

## 2022-10-04 VITALS — BODY MASS INDEX: 18.4 KG/M2

## 2022-10-04 DIAGNOSIS — F19.24: ICD-10-CM

## 2022-10-04 DIAGNOSIS — Z21: ICD-10-CM

## 2022-10-04 DIAGNOSIS — Z86.73: ICD-10-CM

## 2022-10-04 DIAGNOSIS — F12.20: ICD-10-CM

## 2022-10-04 DIAGNOSIS — C25.9: ICD-10-CM

## 2022-10-04 DIAGNOSIS — F19.282: ICD-10-CM

## 2022-10-04 DIAGNOSIS — K21.9: ICD-10-CM

## 2022-10-04 DIAGNOSIS — R63.4: ICD-10-CM

## 2022-10-04 DIAGNOSIS — F14.20: ICD-10-CM

## 2022-10-04 DIAGNOSIS — F17.210: ICD-10-CM

## 2022-10-04 DIAGNOSIS — F10.230: Primary | ICD-10-CM

## 2022-10-04 DIAGNOSIS — G47.00: ICD-10-CM

## 2022-10-04 DIAGNOSIS — F31.9: ICD-10-CM

## 2022-10-04 DIAGNOSIS — Z99.89: ICD-10-CM

## 2022-10-04 DIAGNOSIS — Z86.19: ICD-10-CM

## 2022-10-04 DIAGNOSIS — R15.9: ICD-10-CM

## 2022-10-04 PROCEDURE — HZ2ZZZZ DETOXIFICATION SERVICES FOR SUBSTANCE ABUSE TREATMENT: ICD-10-PCS | Performed by: SURGERY

## 2022-10-04 PROCEDURE — U0003 INFECTIOUS AGENT DETECTION BY NUCLEIC ACID (DNA OR RNA); SEVERE ACUTE RESPIRATORY SYNDROME CORONAVIRUS 2 (SARS-COV-2) (CORONAVIRUS DISEASE [COVID-19]), AMPLIFIED PROBE TECHNIQUE, MAKING USE OF HIGH THROUGHPUT TECHNOLOGIES AS DESCRIBED BY CMS-2020-01-R: HCPCS

## 2022-10-04 PROCEDURE — U0005 INFEC AGEN DETEC AMPLI PROBE: HCPCS

## 2022-10-04 RX ADMIN — Medication SCH MG: at 22:21

## 2022-10-04 RX ADMIN — HYDROXYZINE PAMOATE SCH MG: 25 CAPSULE ORAL at 17:39

## 2022-10-04 RX ADMIN — METHOCARBAMOL PRN MG: 500 TABLET ORAL at 22:22

## 2022-10-04 RX ADMIN — Medication SCH TAB: at 17:39

## 2022-10-04 RX ADMIN — HYDROXYZINE PAMOATE SCH MG: 25 CAPSULE ORAL at 22:21

## 2022-10-05 LAB
ALBUMIN SERPL-MCNC: 2 G/DL (ref 3.4–5)
ALP SERPL-CCNC: 152 U/L (ref 45–117)
ALT SERPL-CCNC: 46 U/L (ref 13–61)
ANION GAP SERPL CALC-SCNC: 7 MMOL/L (ref 8–16)
AST SERPL-CCNC: 79 U/L (ref 15–37)
BILIRUB SERPL-MCNC: 0.3 MG/DL (ref 0.2–1)
BUN SERPL-MCNC: 9.8 MG/DL (ref 7–18)
CALCIUM SERPL-MCNC: 8.4 MG/DL (ref 8.5–10.1)
CHLORIDE SERPL-SCNC: 114 MMOL/L (ref 98–107)
CO2 SERPL-SCNC: 24 MMOL/L (ref 21–32)
CREAT SERPL-MCNC: 0.9 MG/DL (ref 0.55–1.3)
DEPRECATED RDW RBC AUTO: 16.2 % (ref 11.9–15.9)
GLUCOSE SERPL-MCNC: 66 MG/DL (ref 74–106)
HCT VFR BLD CALC: 29.9 % (ref 35.4–49)
HGB BLD-MCNC: 9.6 GM/DL (ref 11.7–16.9)
MCH RBC QN AUTO: 31.4 PG (ref 25.7–33.7)
MCHC RBC AUTO-ENTMCNC: 32.1 G/DL (ref 32–35.9)
MCV RBC: 98 FL (ref 80–96)
PLATELET # BLD AUTO: 106 10^3/UL (ref 134–434)
PMV BLD: 9.3 FL (ref 7.5–11.1)
PROT SERPL-MCNC: 5.8 G/DL (ref 6.4–8.2)
RBC # BLD AUTO: 3.05 M/MM3 (ref 4–5.6)
SODIUM SERPL-SCNC: 145 MMOL/L (ref 136–145)
WBC # BLD AUTO: 4.2 K/MM3 (ref 4–10)

## 2022-10-05 RX ADMIN — Medication SCH TAB: at 10:16

## 2022-10-05 RX ADMIN — Medication SCH MG: at 22:30

## 2022-10-05 RX ADMIN — QUETIAPINE FUMARATE SCH MG: 100 TABLET ORAL at 22:31

## 2022-10-05 RX ADMIN — HYDROXYZINE PAMOATE SCH MG: 25 CAPSULE ORAL at 05:48

## 2022-10-05 RX ADMIN — HYDROXYZINE PAMOATE SCH: 25 CAPSULE ORAL at 14:19

## 2022-10-05 RX ADMIN — HYDROXYZINE PAMOATE SCH MG: 25 CAPSULE ORAL at 17:25

## 2022-10-05 RX ADMIN — HYDROXYZINE PAMOATE SCH MG: 25 CAPSULE ORAL at 10:16

## 2022-10-05 RX ADMIN — HYDROXYZINE PAMOATE SCH MG: 25 CAPSULE ORAL at 22:30

## 2022-10-06 RX ADMIN — LACTULOSE SCH GM: 20 SOLUTION ORAL at 22:17

## 2022-10-06 RX ADMIN — QUETIAPINE FUMARATE SCH MG: 100 TABLET ORAL at 22:17

## 2022-10-06 RX ADMIN — Medication SCH MG: at 22:17

## 2022-10-06 RX ADMIN — Medication SCH TAB: at 10:52

## 2022-10-06 RX ADMIN — LACTULOSE SCH GM: 20 SOLUTION ORAL at 18:47

## 2022-10-06 RX ADMIN — Medication SCH MG: at 22:15

## 2022-10-06 RX ADMIN — LACTULOSE SCH GM: 20 SOLUTION ORAL at 13:51

## 2022-10-06 RX ADMIN — HYDROXYZINE PAMOATE SCH MG: 25 CAPSULE ORAL at 06:11

## 2022-10-06 RX ADMIN — BICTEGRAVIR SODIUM, EMTRICITABINE, AND TENOFOVIR ALAFENAMIDE FUMARATE SCH EACH: 50; 200; 25 TABLET ORAL at 11:24

## 2022-10-06 RX ADMIN — HYDROXYZINE PAMOATE SCH MG: 25 CAPSULE ORAL at 10:52

## 2022-10-06 RX ADMIN — METHOCARBAMOL PRN MG: 500 TABLET ORAL at 22:17

## 2022-10-07 RX ADMIN — QUETIAPINE FUMARATE SCH MG: 100 TABLET ORAL at 22:36

## 2022-10-07 RX ADMIN — LACTULOSE SCH GM: 20 SOLUTION ORAL at 22:35

## 2022-10-07 RX ADMIN — HYDROXYZINE PAMOATE PRN MG: 25 CAPSULE ORAL at 22:36

## 2022-10-07 RX ADMIN — Medication SCH MG: at 22:36

## 2022-10-07 RX ADMIN — LACTULOSE SCH GM: 20 SOLUTION ORAL at 13:28

## 2022-10-07 RX ADMIN — Medication SCH TAB: at 10:09

## 2022-10-07 RX ADMIN — LACTULOSE SCH GM: 20 SOLUTION ORAL at 10:10

## 2022-10-07 RX ADMIN — LACTULOSE SCH: 20 SOLUTION ORAL at 17:44

## 2022-10-07 RX ADMIN — BICTEGRAVIR SODIUM, EMTRICITABINE, AND TENOFOVIR ALAFENAMIDE FUMARATE SCH EACH: 50; 200; 25 TABLET ORAL at 07:06

## 2022-10-08 VITALS
HEART RATE: 88 BPM | SYSTOLIC BLOOD PRESSURE: 104 MMHG | RESPIRATION RATE: 17 BRPM | TEMPERATURE: 97.5 F | DIASTOLIC BLOOD PRESSURE: 72 MMHG

## 2022-10-08 RX ADMIN — LACTULOSE SCH GM: 20 SOLUTION ORAL at 10:51

## 2022-10-08 RX ADMIN — Medication SCH TAB: at 10:51

## 2022-10-08 RX ADMIN — METHOCARBAMOL PRN MG: 500 TABLET ORAL at 10:54

## 2022-10-08 RX ADMIN — HYDROXYZINE PAMOATE PRN MG: 25 CAPSULE ORAL at 10:54

## 2022-10-08 RX ADMIN — BICTEGRAVIR SODIUM, EMTRICITABINE, AND TENOFOVIR ALAFENAMIDE FUMARATE SCH EACH: 50; 200; 25 TABLET ORAL at 11:47

## 2023-01-07 ENCOUNTER — HOSPITAL ENCOUNTER (INPATIENT)
Dept: HOSPITAL 74 - YASAS | Age: 58
LOS: 4 days | Discharge: HOME | End: 2023-01-11
Attending: SURGERY | Admitting: ALLERGY & IMMUNOLOGY
Payer: COMMERCIAL

## 2023-01-07 VITALS — BODY MASS INDEX: 19.5 KG/M2

## 2023-01-07 DIAGNOSIS — F17.210: ICD-10-CM

## 2023-01-07 DIAGNOSIS — F10.282: ICD-10-CM

## 2023-01-07 DIAGNOSIS — C25.9: ICD-10-CM

## 2023-01-07 DIAGNOSIS — Z86.73: ICD-10-CM

## 2023-01-07 DIAGNOSIS — Z91.410: ICD-10-CM

## 2023-01-07 DIAGNOSIS — F10.230: Primary | ICD-10-CM

## 2023-01-07 DIAGNOSIS — R63.4: ICD-10-CM

## 2023-01-07 DIAGNOSIS — Z86.19: ICD-10-CM

## 2023-01-07 DIAGNOSIS — F14.20: ICD-10-CM

## 2023-01-07 DIAGNOSIS — F10.280: ICD-10-CM

## 2023-01-07 DIAGNOSIS — Z62.810: ICD-10-CM

## 2023-01-07 DIAGNOSIS — F31.9: ICD-10-CM

## 2023-01-07 DIAGNOSIS — F25.9: ICD-10-CM

## 2023-01-07 DIAGNOSIS — F19.24: ICD-10-CM

## 2023-01-07 DIAGNOSIS — Z21: ICD-10-CM

## 2023-01-07 DIAGNOSIS — F12.20: ICD-10-CM

## 2023-01-07 DIAGNOSIS — E87.6: ICD-10-CM

## 2023-01-07 DIAGNOSIS — G89.29: ICD-10-CM

## 2023-01-07 DIAGNOSIS — F19.282: ICD-10-CM

## 2023-01-07 DIAGNOSIS — M54.50: ICD-10-CM

## 2023-01-07 PROCEDURE — U0003 INFECTIOUS AGENT DETECTION BY NUCLEIC ACID (DNA OR RNA); SEVERE ACUTE RESPIRATORY SYNDROME CORONAVIRUS 2 (SARS-COV-2) (CORONAVIRUS DISEASE [COVID-19]), AMPLIFIED PROBE TECHNIQUE, MAKING USE OF HIGH THROUGHPUT TECHNOLOGIES AS DESCRIBED BY CMS-2020-01-R: HCPCS

## 2023-01-07 PROCEDURE — HZ2ZZZZ DETOXIFICATION SERVICES FOR SUBSTANCE ABUSE TREATMENT: ICD-10-PCS | Performed by: SURGERY

## 2023-01-07 PROCEDURE — U0005 INFEC AGEN DETEC AMPLI PROBE: HCPCS

## 2023-01-07 RX ADMIN — Medication SCH MG: at 22:17

## 2023-01-08 LAB
ALBUMIN SERPL-MCNC: 2.8 G/DL (ref 3.4–5)
ALP SERPL-CCNC: 100 U/L (ref 45–117)
ALT SERPL-CCNC: 23 U/L (ref 13–61)
ANION GAP SERPL CALC-SCNC: 6 MMOL/L (ref 8–16)
AST SERPL-CCNC: 36 U/L (ref 15–37)
BILIRUB SERPL-MCNC: 0.3 MG/DL (ref 0.2–1)
BUN SERPL-MCNC: 9.7 MG/DL (ref 7–18)
CALCIUM SERPL-MCNC: 9.1 MG/DL (ref 8.5–10.1)
CHLORIDE SERPL-SCNC: 109 MMOL/L (ref 98–107)
CO2 SERPL-SCNC: 26 MMOL/L (ref 21–32)
CREAT SERPL-MCNC: 0.9 MG/DL (ref 0.55–1.3)
DEPRECATED RDW RBC AUTO: 14.1 % (ref 11.9–15.9)
GLUCOSE SERPL-MCNC: 91 MG/DL (ref 74–106)
HCT VFR BLD CALC: 35.1 % (ref 35.4–49)
HGB BLD-MCNC: 10.9 GM/DL (ref 11.7–16.9)
MCH RBC QN AUTO: 31.4 PG (ref 25.7–33.7)
MCHC RBC AUTO-ENTMCNC: 31.1 G/DL (ref 32–35.9)
MCV RBC: 100.9 FL (ref 80–96)
PLATELET # BLD AUTO: 74 10^3/UL (ref 134–434)
PMV BLD: 9.6 FL (ref 7.5–11.1)
PROT SERPL-MCNC: 6.8 G/DL (ref 6.4–8.2)
RBC # BLD AUTO: 3.48 M/MM3 (ref 4–5.6)
SODIUM SERPL-SCNC: 141 MMOL/L (ref 136–145)
WBC # BLD AUTO: 4.3 K/MM3 (ref 4–10)

## 2023-01-08 RX ADMIN — Medication SCH MG: at 22:06

## 2023-01-08 RX ADMIN — BICTEGRAVIR SODIUM, EMTRICITABINE, AND TENOFOVIR ALAFENAMIDE FUMARATE SCH EACH: 50; 200; 25 TABLET ORAL at 13:22

## 2023-01-08 RX ADMIN — QUETIAPINE FUMARATE SCH MG: 100 TABLET ORAL at 22:06

## 2023-01-08 RX ADMIN — Medication SCH TAB: at 11:02

## 2023-01-09 RX ADMIN — Medication SCH TAB: at 10:28

## 2023-01-09 RX ADMIN — Medication SCH MG: at 22:10

## 2023-01-09 RX ADMIN — BICTEGRAVIR SODIUM, EMTRICITABINE, AND TENOFOVIR ALAFENAMIDE FUMARATE SCH EACH: 50; 200; 25 TABLET ORAL at 10:41

## 2023-01-09 RX ADMIN — METHOCARBAMOL PRN MG: 500 TABLET ORAL at 10:28

## 2023-01-09 RX ADMIN — QUETIAPINE FUMARATE SCH MG: 100 TABLET ORAL at 22:10

## 2023-01-10 RX ADMIN — Medication SCH MG: at 22:29

## 2023-01-10 RX ADMIN — BICTEGRAVIR SODIUM, EMTRICITABINE, AND TENOFOVIR ALAFENAMIDE FUMARATE SCH EACH: 50; 200; 25 TABLET ORAL at 10:39

## 2023-01-10 RX ADMIN — METHOCARBAMOL PRN MG: 500 TABLET ORAL at 22:33

## 2023-01-10 RX ADMIN — QUETIAPINE FUMARATE SCH MG: 100 TABLET ORAL at 22:29

## 2023-01-10 RX ADMIN — Medication SCH TAB: at 10:39

## 2023-01-11 VITALS — HEART RATE: 95 BPM | SYSTOLIC BLOOD PRESSURE: 120 MMHG | DIASTOLIC BLOOD PRESSURE: 86 MMHG | TEMPERATURE: 97.8 F

## 2023-01-11 VITALS — RESPIRATION RATE: 18 BRPM

## 2023-01-11 RX ADMIN — BICTEGRAVIR SODIUM, EMTRICITABINE, AND TENOFOVIR ALAFENAMIDE FUMARATE SCH EACH: 50; 200; 25 TABLET ORAL at 10:29

## 2023-01-11 RX ADMIN — Medication SCH: at 10:32

## 2023-02-08 ENCOUNTER — HOSPITAL ENCOUNTER (INPATIENT)
Dept: HOSPITAL 74 - YASAS | Age: 58
LOS: 2 days | Discharge: LEFT BEFORE DISCHARGE | DRG: 770 | End: 2023-02-10
Attending: SURGERY | Admitting: ALLERGY & IMMUNOLOGY
Payer: COMMERCIAL

## 2023-02-08 VITALS — BODY MASS INDEX: 19.1 KG/M2

## 2023-02-08 DIAGNOSIS — C25.9: ICD-10-CM

## 2023-02-08 DIAGNOSIS — R63.4: ICD-10-CM

## 2023-02-08 DIAGNOSIS — Z86.19: ICD-10-CM

## 2023-02-08 DIAGNOSIS — Z21: ICD-10-CM

## 2023-02-08 DIAGNOSIS — Z91.410: ICD-10-CM

## 2023-02-08 DIAGNOSIS — K21.9: ICD-10-CM

## 2023-02-08 DIAGNOSIS — F17.210: ICD-10-CM

## 2023-02-08 DIAGNOSIS — M54.50: ICD-10-CM

## 2023-02-08 DIAGNOSIS — Z86.73: ICD-10-CM

## 2023-02-08 DIAGNOSIS — F10.230: Primary | ICD-10-CM

## 2023-02-08 DIAGNOSIS — Z62.810: ICD-10-CM

## 2023-02-08 DIAGNOSIS — F31.9: ICD-10-CM

## 2023-02-08 DIAGNOSIS — F13.230: ICD-10-CM

## 2023-02-08 DIAGNOSIS — F10.280: ICD-10-CM

## 2023-02-08 DIAGNOSIS — G89.29: ICD-10-CM

## 2023-02-08 DIAGNOSIS — F10.282: ICD-10-CM

## 2023-02-08 PROCEDURE — HZ2ZZZZ DETOXIFICATION SERVICES FOR SUBSTANCE ABUSE TREATMENT: ICD-10-PCS | Performed by: SURGERY

## 2023-02-08 PROCEDURE — U0003 INFECTIOUS AGENT DETECTION BY NUCLEIC ACID (DNA OR RNA); SEVERE ACUTE RESPIRATORY SYNDROME CORONAVIRUS 2 (SARS-COV-2) (CORONAVIRUS DISEASE [COVID-19]), AMPLIFIED PROBE TECHNIQUE, MAKING USE OF HIGH THROUGHPUT TECHNOLOGIES AS DESCRIBED BY CMS-2020-01-R: HCPCS

## 2023-02-08 PROCEDURE — U0005 INFEC AGEN DETEC AMPLI PROBE: HCPCS

## 2023-02-08 RX ADMIN — HYDROXYZINE PAMOATE PRN MG: 25 CAPSULE ORAL at 22:30

## 2023-02-08 RX ADMIN — Medication SCH MG: at 22:31

## 2023-02-08 RX ADMIN — HYDROXYZINE PAMOATE PRN MG: 25 CAPSULE ORAL at 15:38

## 2023-02-08 RX ADMIN — Medication SCH MG: at 22:30

## 2023-02-09 RX ADMIN — Medication SCH TAB: at 10:08

## 2023-02-09 RX ADMIN — Medication SCH MG: at 22:29

## 2023-02-09 RX ADMIN — Medication SCH: at 22:31

## 2023-02-09 RX ADMIN — ALUMINUM HYDROXIDE, MAGNESIUM HYDROXIDE, AND SIMETHICONE PRN ML: 200; 200; 20 SUSPENSION ORAL at 16:19

## 2023-02-09 RX ADMIN — HYDROXYZINE PAMOATE PRN MG: 25 CAPSULE ORAL at 10:08

## 2023-02-09 RX ADMIN — ALUMINUM HYDROXIDE, MAGNESIUM HYDROXIDE, AND SIMETHICONE PRN ML: 200; 200; 20 SUSPENSION ORAL at 10:09

## 2023-02-09 RX ADMIN — BICTEGRAVIR SODIUM, EMTRICITABINE, AND TENOFOVIR ALAFENAMIDE FUMARATE SCH EACH: 50; 200; 25 TABLET ORAL at 15:12

## 2023-02-10 VITALS — DIASTOLIC BLOOD PRESSURE: 88 MMHG | TEMPERATURE: 97.7 F | SYSTOLIC BLOOD PRESSURE: 126 MMHG | HEART RATE: 98 BPM

## 2023-02-10 VITALS — RESPIRATION RATE: 18 BRPM

## 2023-02-10 LAB
ALBUMIN SERPL-MCNC: 3.3 G/DL (ref 3.4–5)
ALP SERPL-CCNC: 106 U/L (ref 45–117)
ALT SERPL-CCNC: 38 U/L (ref 13–61)
ANION GAP SERPL CALC-SCNC: 7 MMOL/L (ref 8–16)
AST SERPL-CCNC: 43 U/L (ref 15–37)
BILIRUB SERPL-MCNC: 0.5 MG/DL (ref 0.2–1)
BUN SERPL-MCNC: 8.9 MG/DL (ref 7–18)
CALCIUM SERPL-MCNC: 9.9 MG/DL (ref 8.5–10.1)
CHLORIDE SERPL-SCNC: 109 MMOL/L (ref 98–107)
CO2 SERPL-SCNC: 26 MMOL/L (ref 21–32)
CREAT SERPL-MCNC: 1.1 MG/DL (ref 0.55–1.3)
DEPRECATED RDW RBC AUTO: 14.1 % (ref 11.9–15.9)
GLUCOSE SERPL-MCNC: 78 MG/DL (ref 74–106)
HCT VFR BLD CALC: 38.6 % (ref 35.4–49)
HGB BLD-MCNC: 12.2 GM/DL (ref 11.7–16.9)
MCH RBC QN AUTO: 31.3 PG (ref 25.7–33.7)
MCHC RBC AUTO-ENTMCNC: 31.5 G/DL (ref 32–35.9)
MCV RBC: 99.6 FL (ref 80–96)
PLATELET # BLD AUTO: 152 10^3/UL (ref 134–434)
PMV BLD: 9 FL (ref 7.5–11.1)
PROT SERPL-MCNC: 7.4 G/DL (ref 6.4–8.2)
RBC # BLD AUTO: 3.88 M/MM3 (ref 4–5.6)
SODIUM SERPL-SCNC: 141 MMOL/L (ref 136–145)
WBC # BLD AUTO: 4.7 K/MM3 (ref 4–10)

## 2023-02-10 RX ADMIN — BICTEGRAVIR SODIUM, EMTRICITABINE, AND TENOFOVIR ALAFENAMIDE FUMARATE SCH EACH: 50; 200; 25 TABLET ORAL at 07:18

## 2023-02-10 RX ADMIN — ALUMINUM HYDROXIDE, MAGNESIUM HYDROXIDE, AND SIMETHICONE PRN ML: 200; 200; 20 SUSPENSION ORAL at 11:00

## 2023-02-10 RX ADMIN — Medication SCH TAB: at 10:15

## 2023-11-17 ENCOUNTER — HOSPITAL ENCOUNTER (INPATIENT)
Dept: HOSPITAL 74 - YASAS | Age: 58
LOS: 1 days | Discharge: LEFT BEFORE DISCHARGE | DRG: 770 | End: 2023-11-18
Attending: SURGERY | Admitting: ALLERGY & IMMUNOLOGY
Payer: COMMERCIAL

## 2023-11-17 VITALS — BODY MASS INDEX: 18.3 KG/M2

## 2023-11-17 DIAGNOSIS — F19.282: ICD-10-CM

## 2023-11-17 DIAGNOSIS — Z86.11: ICD-10-CM

## 2023-11-17 DIAGNOSIS — F12.20: ICD-10-CM

## 2023-11-17 DIAGNOSIS — Z62.810: ICD-10-CM

## 2023-11-17 DIAGNOSIS — Z91.410: ICD-10-CM

## 2023-11-17 DIAGNOSIS — F31.9: ICD-10-CM

## 2023-11-17 DIAGNOSIS — I69.854: ICD-10-CM

## 2023-11-17 DIAGNOSIS — Z87.19: ICD-10-CM

## 2023-11-17 DIAGNOSIS — F19.24: ICD-10-CM

## 2023-11-17 DIAGNOSIS — Z21: ICD-10-CM

## 2023-11-17 DIAGNOSIS — F10.230: Primary | ICD-10-CM

## 2023-11-17 DIAGNOSIS — K21.9: ICD-10-CM

## 2023-11-17 PROCEDURE — HZ2ZZZZ DETOXIFICATION SERVICES FOR SUBSTANCE ABUSE TREATMENT: ICD-10-PCS | Performed by: SURGERY

## 2023-11-17 RX ADMIN — Medication SCH TAB: at 10:59

## 2023-11-17 RX ADMIN — ASPIRIN SCH MG: 81 TABLET, COATED ORAL at 10:59

## 2023-11-17 RX ADMIN — PANCRELIPASE SCH CAP: 30000; 6000; 19000 CAPSULE, DELAYED RELEASE PELLETS ORAL at 17:22

## 2023-11-18 VITALS — SYSTOLIC BLOOD PRESSURE: 122 MMHG | DIASTOLIC BLOOD PRESSURE: 88 MMHG | RESPIRATION RATE: 18 BRPM | TEMPERATURE: 97.1 F

## 2023-11-18 VITALS — HEART RATE: 99 BPM

## 2023-11-18 LAB
ALBUMIN SERPL-MCNC: 3.7 G/DL (ref 3.4–5)
ALP SERPL-CCNC: 150 U/L (ref 45–117)
ALT SERPL-CCNC: 45 U/L (ref 13–61)
ANION GAP SERPL CALC-SCNC: 8 MMOL/L (ref 4–13)
AST SERPL-CCNC: 76 U/L (ref 15–37)
BILIRUB SERPL-MCNC: 0.8 MG/DL (ref 0.2–1)
BUN SERPL-MCNC: 11 MG/DL (ref 7–18)
CALCIUM SERPL-MCNC: 9.3 MG/DL (ref 8.5–10.1)
CHLORIDE SERPL-SCNC: 103 MMOL/L (ref 98–107)
CO2 SERPL-SCNC: 28 MMOL/L (ref 21–32)
CREAT SERPL-MCNC: 1.2 MG/DL (ref 0.55–1.3)
DEPRECATED RDW RBC AUTO: 13.8 % (ref 11.9–15.9)
GLUCOSE SERPL-MCNC: 75 MG/DL (ref 74–106)
HCT VFR BLD CALC: 34.2 % (ref 35.4–49)
HGB BLD-MCNC: 11.4 GM/DL (ref 11.7–16.9)
MCH RBC QN AUTO: 34 PG (ref 25.7–33.7)
MCHC RBC AUTO-ENTMCNC: 33.3 G/DL (ref 32–35.9)
MCV RBC: 102.2 FL (ref 80–96)
PLATELET # BLD AUTO: 193 10^3/UL (ref 134–434)
PMV BLD: 8.4 FL (ref 7.5–11.1)
POTASSIUM SERPLBLD-SCNC: 4 MMOL/L (ref 3.5–5.1)
PROT SERPL-MCNC: 9 G/DL (ref 6.4–8.2)
RBC # BLD AUTO: 3.35 M/MM3 (ref 4–5.6)
SODIUM SERPL-SCNC: 139 MMOL/L (ref 136–145)
WBC # BLD AUTO: 6.4 K/MM3 (ref 4–10)

## 2023-11-18 RX ADMIN — Medication SCH TAB: at 10:26

## 2023-11-18 RX ADMIN — PANCRELIPASE SCH CAP: 30000; 6000; 19000 CAPSULE, DELAYED RELEASE PELLETS ORAL at 11:40

## 2023-11-18 RX ADMIN — PANCRELIPASE SCH CAP: 30000; 6000; 19000 CAPSULE, DELAYED RELEASE PELLETS ORAL at 08:13

## 2023-11-18 RX ADMIN — IBUPROFEN PRN MG: 600 TABLET, FILM COATED ORAL at 05:33

## 2023-11-18 RX ADMIN — ASPIRIN SCH MG: 81 TABLET, COATED ORAL at 10:26

## 2023-11-18 RX ADMIN — IBUPROFEN PRN MG: 600 TABLET, FILM COATED ORAL at 11:50

## 2024-04-17 ENCOUNTER — HOSPITAL ENCOUNTER (INPATIENT)
Dept: HOSPITAL 74 - YASAS | Age: 59
LOS: 2 days | Discharge: LEFT BEFORE DISCHARGE | DRG: 770 | End: 2024-04-19
Attending: SURGERY | Admitting: ALLERGY & IMMUNOLOGY
Payer: COMMERCIAL

## 2024-04-17 VITALS — BODY MASS INDEX: 21.1 KG/M2

## 2024-04-17 DIAGNOSIS — G47.00: ICD-10-CM

## 2024-04-17 DIAGNOSIS — F31.9: ICD-10-CM

## 2024-04-17 DIAGNOSIS — M54.50: ICD-10-CM

## 2024-04-17 DIAGNOSIS — Z91.410: ICD-10-CM

## 2024-04-17 DIAGNOSIS — Z86.11: ICD-10-CM

## 2024-04-17 DIAGNOSIS — Z21: ICD-10-CM

## 2024-04-17 DIAGNOSIS — F12.20: ICD-10-CM

## 2024-04-17 DIAGNOSIS — Z63.0: ICD-10-CM

## 2024-04-17 DIAGNOSIS — F17.210: ICD-10-CM

## 2024-04-17 DIAGNOSIS — Z63.8: ICD-10-CM

## 2024-04-17 DIAGNOSIS — Z86.19: ICD-10-CM

## 2024-04-17 DIAGNOSIS — I69.854: ICD-10-CM

## 2024-04-17 DIAGNOSIS — F10.230: Primary | ICD-10-CM

## 2024-04-17 DIAGNOSIS — Z62.810: ICD-10-CM

## 2024-04-17 DIAGNOSIS — G89.29: ICD-10-CM

## 2024-04-17 PROCEDURE — HZ2ZZZZ DETOXIFICATION SERVICES FOR SUBSTANCE ABUSE TREATMENT: ICD-10-PCS | Performed by: SURGERY

## 2024-04-17 RX ADMIN — Medication SCH MG: at 22:16

## 2024-04-17 RX ADMIN — PANCRELIPASE SCH CAP: 30000; 6000; 19000 CAPSULE, DELAYED RELEASE PELLETS ORAL at 17:14

## 2024-04-17 SDOH — SOCIAL STABILITY - SOCIAL INSECURITY: PROBLEMS IN RELATIONSHIP WITH SPOUSE OR PARTNER: Z63.0

## 2024-04-17 SDOH — SOCIAL STABILITY - SOCIAL INSECURITY: OTHER SPECIFIED PROBLEMS RELATED TO PRIMARY SUPPORT GROUP: Z63.8

## 2024-04-18 LAB
ALBUMIN SERPL-MCNC: 3.1 G/DL (ref 3.4–5)
ALP SERPL-CCNC: 180 U/L (ref 45–117)
ALT SERPL-CCNC: 37 U/L (ref 13–61)
ANION GAP SERPL CALC-SCNC: 8 MMOL/L (ref 4–13)
AST SERPL-CCNC: 135 U/L (ref 15–37)
BILIRUB SERPL-MCNC: 1.1 MG/DL (ref 0.2–1)
BUN SERPL-MCNC: 6.9 MG/DL (ref 7–18)
CALCIUM SERPL-MCNC: 9.9 MG/DL (ref 8.5–10.1)
CHLORIDE SERPL-SCNC: 103 MMOL/L (ref 98–107)
CO2 SERPL-SCNC: 27 MMOL/L (ref 21–32)
CREAT SERPL-MCNC: 1.1 MG/DL (ref 0.55–1.3)
DEPRECATED RDW RBC AUTO: 14.9 % (ref 11.9–15.9)
GLUCOSE SERPL-MCNC: 90 MG/DL (ref 74–106)
HCT VFR BLD CALC: 35.8 % (ref 35.4–49)
HGB BLD-MCNC: 11.9 GM/DL (ref 11.7–16.9)
MCH RBC QN AUTO: 33.2 PG (ref 25.7–33.7)
MCHC RBC AUTO-ENTMCNC: 33.2 G/DL (ref 32–35.9)
MCV RBC: 100 FL (ref 80–96)
PLATELET # BLD AUTO: 40 10^3/UL (ref 134–434)
PMV BLD: 10.2 FL (ref 7.5–11.1)
POTASSIUM SERPLBLD-SCNC: 3.3 MMOL/L (ref 3.5–5.1)
PROT SERPL-MCNC: 7.4 G/DL (ref 6.4–8.2)
RBC # BLD AUTO: 3.58 M/MM3 (ref 4–5.6)
SODIUM SERPL-SCNC: 138 MMOL/L (ref 136–145)
WBC # BLD AUTO: 4.4 K/MM3 (ref 4–10)

## 2024-04-18 RX ADMIN — BICTEGRAVIR SODIUM, EMTRICITABINE, AND TENOFOVIR ALAFENAMIDE FUMARATE SCH EACH: 50; 200; 25 TABLET ORAL at 10:18

## 2024-04-18 RX ADMIN — METHOCARBAMOL PRN MG: 500 TABLET ORAL at 05:16

## 2024-04-18 RX ADMIN — FOLIC ACID SCH MG: 1 TABLET ORAL at 10:18

## 2024-04-18 RX ADMIN — Medication SCH TAB: at 10:18

## 2024-04-19 VITALS
SYSTOLIC BLOOD PRESSURE: 119 MMHG | DIASTOLIC BLOOD PRESSURE: 88 MMHG | HEART RATE: 81 BPM | TEMPERATURE: 96.2 F | RESPIRATION RATE: 16 BRPM